# Patient Record
Sex: MALE | Race: WHITE
[De-identification: names, ages, dates, MRNs, and addresses within clinical notes are randomized per-mention and may not be internally consistent; named-entity substitution may affect disease eponyms.]

---

## 2017-12-18 ENCOUNTER — HOSPITAL ENCOUNTER (OUTPATIENT)
Dept: HOSPITAL 45 - C.LAB | Age: 57
Discharge: HOME | End: 2017-12-18
Attending: SURGERY
Payer: COMMERCIAL

## 2017-12-18 DIAGNOSIS — K40.90: ICD-10-CM

## 2017-12-18 DIAGNOSIS — Z01.812: Primary | ICD-10-CM

## 2017-12-18 LAB
ANION GAP SERPL CALC-SCNC: 0 MMOL/L (ref 3–11)
BASOPHILS # BLD: 0.03 K/UL (ref 0–0.2)
BASOPHILS NFR BLD: 0.4 %
BUN SERPL-MCNC: 16 MG/DL (ref 7–18)
BUN/CREAT SERPL: 15.3 (ref 10–20)
CALCIUM SERPL-MCNC: 8.8 MG/DL (ref 8.5–10.1)
CHLORIDE SERPL-SCNC: 103 MMOL/L (ref 98–107)
CO2 SERPL-SCNC: 34 MMOL/L (ref 21–32)
COMPLETE: YES
CREAT SERPL-MCNC: 1.05 MG/DL (ref 0.6–1.4)
EOSINOPHIL NFR BLD AUTO: 205 K/UL (ref 130–400)
GLUCOSE SERPL-MCNC: 83 MG/DL (ref 70–99)
HCT VFR BLD CALC: 44.7 % (ref 42–52)
IG%: 0.4 %
IMM GRANULOCYTES NFR BLD AUTO: 34.9 %
LYMPHOCYTES # BLD: 2.52 K/UL (ref 1.2–3.4)
MCH RBC QN AUTO: 32 PG (ref 25–34)
MCHC RBC AUTO-ENTMCNC: 34.5 G/DL (ref 32–36)
MCV RBC AUTO: 92.7 FL (ref 80–100)
MONOCYTES NFR BLD: 9.6 %
NEUTROPHILS # BLD AUTO: 2.9 %
NEUTROPHILS NFR BLD AUTO: 51.8 %
PMV BLD AUTO: 9.7 FL (ref 7.4–10.4)
POTASSIUM SERPL-SCNC: 4 MMOL/L (ref 3.5–5.1)
RBC # BLD AUTO: 4.82 M/UL (ref 4.7–6.1)
SODIUM SERPL-SCNC: 137 MMOL/L (ref 136–145)
WBC # BLD AUTO: 7.22 K/UL (ref 4.8–10.8)

## 2018-01-12 ENCOUNTER — HOSPITAL ENCOUNTER (OUTPATIENT)
Dept: HOSPITAL 45 - X.SURG | Age: 58
Discharge: HOME | End: 2018-01-12
Attending: SURGERY
Payer: COMMERCIAL

## 2018-01-12 VITALS — HEART RATE: 67 BPM | OXYGEN SATURATION: 98 % | SYSTOLIC BLOOD PRESSURE: 127 MMHG | DIASTOLIC BLOOD PRESSURE: 85 MMHG

## 2018-01-12 VITALS
WEIGHT: 180.38 LBS | HEIGHT: 69.02 IN | WEIGHT: 180.38 LBS | BODY MASS INDEX: 26.72 KG/M2 | BODY MASS INDEX: 26.72 KG/M2 | HEIGHT: 69.02 IN

## 2018-01-12 VITALS — TEMPERATURE: 97.52 F

## 2018-01-12 DIAGNOSIS — D17.5: ICD-10-CM

## 2018-01-12 DIAGNOSIS — Z98.52: ICD-10-CM

## 2018-01-12 DIAGNOSIS — Z79.82: ICD-10-CM

## 2018-01-12 DIAGNOSIS — Z80.51: ICD-10-CM

## 2018-01-12 DIAGNOSIS — E78.00: ICD-10-CM

## 2018-01-12 DIAGNOSIS — Z83.3: ICD-10-CM

## 2018-01-12 DIAGNOSIS — Z82.49: ICD-10-CM

## 2018-01-12 DIAGNOSIS — Z80.8: ICD-10-CM

## 2018-01-12 DIAGNOSIS — K40.90: Primary | ICD-10-CM

## 2018-01-12 NOTE — [facility] OPERATIVE REPORT
Operative Report


Operative Date


Jan 12, 2018.





Pre-Operative Diagnosis





Right Inguinal hernia





Post-Operative Diagnosis





Same as pre-op





Procedure(s) Performed





Right Inguinal Hernia Open Repair with Mesh





Surgeon








Assistant Surgeon(s)


Lisa LOAIZA





Estimated Blood Loss


10ML





Findings


direct defect and small indirect w/ lipoma





Specimens





A.Right Inguinal area lipoma





Anesthesia


Gen





Complication(s)


None





Disposition


Recovery Room / PACU


I attest to the content of the Intraoperative Record and any orders documented 

therein.  Any exceptions are noted below.

## 2018-01-12 NOTE — DISCHARGE INSTRUCTIONS-[facility]
Discharge Instructions


Date of Service


Jan 12, 2018.





Visit


Reason for Visit:  Right Inguinal Hernia





Discharge


Discharge Diagnosis / Problem:  Rt inguinal hernia





Discharge Goals


Goal(s):  Decrease discomfort, Improve function, Improve disease control





Medications


Stopped Medications Name(s):  


HELD ASPIRIN , FISH OIL AND COQ10 FOR ONE WEEK





Activity Recommendations


Activity Limitations:  as noted below


Lifting Limitations:  no more than 25 pounds


Exercise/Sports Limitations:  until after follow-up appointment


May Resume Sexual Activity:  when tolerated


Shower/Bathe:  keep incision dry (may shower over incision on Sunday 1/14)


Driving or Machine Use:  5 days





Anesthesia


.





Post Anesthesia Instructions:





If you have had General Anesthesia or IV Sedation:





*  Do not drive today.


*  Resume driving when surgeon permits.


*  Do not make important decisions or sign legal documents today.


*  Call surgeon for:





   1.  Temperature elevations greater than 101 degrees F.


   2.  Uncontrollable pain.


   3.  Excessive bleeding.


   4.  Persistent nausea and vomiting.


   5.  Medication intolerance (nausea, vomiting or rash).





*  For nausea and vomiting use only clear liquids such as: tea, soda, bouillon 

until nausea subsides, then gradually increase diet as tolerated.





*  If you have any concerns or questions, call your surgeon's office.  If 

physician is unavailable and it is an emergency, call 911 or go to the nearest 

emergency room.





.





Instructions / Follow-Up


Instructions / Follow-Up





SPECIAL CARE INSTRUCTIONS:





*  Cover incisions and change daily for comfort/drainage.





* Leave steri strips in place





* Avoid constipation- may use Senokot S and Milk of magnesia twice daily as 

directed on the package





*  May use ibuprofen for pain as tolerated.





*  Expect some swelling and bruising.





Call your doctor if:





*  Temperature above 101 degrees





*  Pain not relieved by pain medicine ordered





*  There is increased drainage or redness from any incision





*  You have any unanswered questions or concerns [phone].








FOLLOW UP VISIT:





If not already scheduled, please call the office for a follow-up visit.





for next week- some suture removal





OFFICE PHONE NUMBER:





Dr. Browne Office Phone Number:  [phone]








Diet Recommendations


Home Diet:  resume previous diet





Pending Studies


Studies pending at discharge:  no





Medical Emergencies








.


Who to Call and When:





Medical Emergencies:  If at any time you feel your situation is an emergency, 

please call 911 immediately.





.





Non-Emergent Contact


Non-Emergency issues call your:  Primary Care Provider, Surgeon





.


.








"Provider Documentation" section prepared by Pablo Browne.








.

## 2018-01-12 NOTE — OPERATIVE REPORT
DATE OF OPERATION:  01/12/2018

 

NAME OF OPERATION:  Open right inguinal hernia repair.

 

PREOPERATIVE DIAGNOSIS:  Right inguinal hernia.

 

POSTOPERATIVE DIAGNOSIS:  Same with direct defect, small indirect defect and

lipoma.

 

STAFF SURGEON:  Pablo Browne MD.

 

ASSISTANT:  Adam Magana PA-C.

 

ANESTHESIA:  General.

 

DESCRIPTION OF PROCEDURE:  The patient was brought in the operating room and

placed on the operating table in the supine position.  His lower abdomen was

prepped and draped in usual fashion.  0.5% plain Marcaine was used to

anesthetize the skin and subcutaneous tissue in the right inguinal area. 

Incision was made parallel to the inguinal ligament, carrying dissection down

identifying the external oblique fibers incising them along their length to

the external ring.  Ilioinguinal and iliohypogastric nerves were identified. 

Cord structures were mobilized.  The patient did have a moderate direct

inguinal hernia and a small indirect inguinal hernia with lipoma.  These were

reduced.  The direct defect was reinforced using a mesh plug, secured to

surrounding tissue using 2-0 Ethibond suture, then a mesh patch placed into

the floor of the canal around the cord structures and secured to surrounding

tissue using 2-0 Ethibond suture.  The site was irrigated with antibiotic

solution.  Then the external oblique fibers closed over the mesh around the

cord structures using 2-0 Ethibond suture.  Subcutaneous tissue was

reapproximated using 2-0 plain catgut suture.  This was after the deep tissue

was anesthetized using 0.5% plain Marcaine.  The skin was reapproximated

using 4-0 nylon suture.  Dressing applied and patient transferred to recovery

room in stable condition.

 

 

I attest to the content of the Intraoperative Record and any orders documented therein. Any exception
s are noted below.

## 2018-01-12 NOTE — ANESTHESIOLOGY PROGRESS NOTE
Anesthesia Post Op Note


Date & Time


Jan 12, 2018 at 10:47





Vital Signs


Pain Intensity:  1





Vital Signs Past 12 Hours








  Date Time  Temp Pulse Resp B/P (MAP) Pulse Ox O2 Delivery O2 Flow Rate FiO2


 


1/12/18 10:31 36.4 64 16 121/74 (90) 97 Room Air  


 


1/12/18 10:21  76 14  98   


 


1/12/18 10:21  75 14     


 


1/12/18 10:20    126/83    


 


1/12/18 10:16  73 14     


 


1/12/18 10:16  70 14  98   


 


1/12/18 10:15    123/82    


 


1/12/18 10:14 36.5 70 17 123/82 99 Room Air  


 


1/12/18 10:11  70 15  100   


 


1/12/18 10:11  70 15     


 


1/12/18 10:10    131/80    


 


1/12/18 10:06  79 18  99   


 


1/12/18 10:06  79 18     


 


1/12/18 10:05    123/79    


 


1/12/18 10:01  79 16  100   


 


1/12/18 10:01  80 16     


 


1/12/18 10:00    138/89    


 


1/12/18 09:56  78 22     


 


1/12/18 09:56  77 22  99   


 


1/12/18 09:55    130/89    


 


1/12/18 09:51  76 13     


 


1/12/18 09:51  78 13  98   


 


1/12/18 09:50    124/87    


 


1/12/18 09:46  89 16     


 


1/12/18 09:46  88 16 106/78 100   


 


1/12/18 09:41  73 7     


 


1/12/18 09:41  72 7  98   


 


1/12/18 09:40    131/89    


 


1/12/18 09:37 36.4 72 20 144/96 100 Diffusion Mask  


 


1/12/18 09:36  81 22 144/96 100   


 


1/12/18 09:36  82 22     


 


1/12/18 07:14 36.9 73 16 131/82 (98) 96 Room Air  











Notes


Mental Status:  alert / awake / arousable, participated in evaluation


Pt Amnestic to Procedure:  Yes


Nausea / Vomiting:  adequately controlled


Pain:  adequately controlled


Airway Patency, RR, SpO2:  stable & adequate


BP & HR:  stable & adequate


Hydration State:  stable & adequate


Anesthetic Complications:  no major complications apparent